# Patient Record
Sex: MALE | Race: NATIVE HAWAIIAN OR OTHER PACIFIC ISLANDER | NOT HISPANIC OR LATINO | Employment: FULL TIME | URBAN - METROPOLITAN AREA
[De-identification: names, ages, dates, MRNs, and addresses within clinical notes are randomized per-mention and may not be internally consistent; named-entity substitution may affect disease eponyms.]

---

## 2024-08-06 ENCOUNTER — OFFICE VISIT (OUTPATIENT)
Dept: OTOLARYNGOLOGY | Facility: CLINIC | Age: 44
End: 2024-08-06
Payer: COMMERCIAL

## 2024-08-06 VITALS — WEIGHT: 198 LBS | HEIGHT: 72 IN | BODY MASS INDEX: 26.82 KG/M2

## 2024-08-06 DIAGNOSIS — H61.22 IMPACTED CERUMEN OF LEFT EAR: Primary | ICD-10-CM

## 2024-08-06 PROCEDURE — 69210 REMOVE IMPACTED EAR WAX UNI: CPT | Mod: S$GLB,,, | Performed by: OTOLARYNGOLOGY

## 2024-08-06 PROCEDURE — 99999 PR PBB SHADOW E&M-NEW PATIENT-LVL III: CPT | Mod: PBBFAC,,, | Performed by: OTOLARYNGOLOGY

## 2024-08-06 PROCEDURE — 99499 UNLISTED E&M SERVICE: CPT | Mod: 25,S$GLB,, | Performed by: OTOLARYNGOLOGY

## 2024-11-18 ENCOUNTER — OFFICE VISIT (OUTPATIENT)
Dept: UROLOGY | Facility: CLINIC | Age: 44
End: 2024-11-18
Payer: COMMERCIAL

## 2024-11-18 ENCOUNTER — HOSPITAL ENCOUNTER (OUTPATIENT)
Dept: RADIOLOGY | Facility: HOSPITAL | Age: 44
Discharge: HOME | End: 2024-11-18
Attending: UROLOGY
Payer: COMMERCIAL

## 2024-11-18 ENCOUNTER — TELEPHONE (OUTPATIENT)
Dept: UROLOGY | Facility: CLINIC | Age: 44
End: 2024-11-18

## 2024-11-18 VITALS
BODY MASS INDEX: 26.18 KG/M2 | DIASTOLIC BLOOD PRESSURE: 87 MMHG | SYSTOLIC BLOOD PRESSURE: 147 MMHG | HEIGHT: 72 IN | WEIGHT: 193.31 LBS | HEART RATE: 97 BPM | RESPIRATION RATE: 18 BRPM

## 2024-11-18 DIAGNOSIS — N50.9 TESTICULAR LESION: Primary | ICD-10-CM

## 2024-11-18 DIAGNOSIS — N50.9 TESTICULAR LESION: ICD-10-CM

## 2024-11-18 PROCEDURE — 3077F SYST BP >= 140 MM HG: CPT | Mod: CPTII,S$GLB,, | Performed by: UROLOGY

## 2024-11-18 PROCEDURE — 99999 PR PBB SHADOW E&M-EST. PATIENT-LVL III: CPT | Mod: PBBFAC,,, | Performed by: UROLOGY

## 2024-11-18 PROCEDURE — 76870 US EXAM SCROTUM: CPT | Mod: TC,PN

## 2024-11-18 PROCEDURE — 3008F BODY MASS INDEX DOCD: CPT | Mod: CPTII,S$GLB,, | Performed by: UROLOGY

## 2024-11-18 PROCEDURE — 1160F RVW MEDS BY RX/DR IN RCRD: CPT | Mod: CPTII,S$GLB,, | Performed by: UROLOGY

## 2024-11-18 PROCEDURE — 1159F MED LIST DOCD IN RCRD: CPT | Mod: CPTII,S$GLB,, | Performed by: UROLOGY

## 2024-11-18 PROCEDURE — 3079F DIAST BP 80-89 MM HG: CPT | Mod: CPTII,S$GLB,, | Performed by: UROLOGY

## 2024-11-18 PROCEDURE — 99204 OFFICE O/P NEW MOD 45 MIN: CPT | Mod: S$GLB,,, | Performed by: UROLOGY

## 2024-11-18 PROCEDURE — 76870 US EXAM SCROTUM: CPT | Mod: 26,,, | Performed by: STUDENT IN AN ORGANIZED HEALTH CARE EDUCATION/TRAINING PROGRAM

## 2024-11-18 NOTE — TELEPHONE ENCOUNTER
Called patient and let him know that everything was clear and we will follow up as needed and if he needs anything to contact our office. Patient expressed understanding.      ----- Message from Sid Cleveland MD sent at 11/18/2024  1:45 PM CST -----  Let him know that it is clear and will follow on an as needed basis

## 2024-11-18 NOTE — PROGRESS NOTES
Chief Complaint:   Encounter Diagnosis   Name Primary?    Testicular lesion Yes       HPI:  43-year-old gentleman who noted this weekend to have a lesion on his right testicle.  States that he rechecked a yesterday in his seems to have gone, no previous history of this.  It appears to be at the lower pole per his report.  No lower urinary tract symptoms, no evidence of pain, no other urological history, no family history of urological cancers or stones.    Allergies:  Patient has no known allergies.    Medications:  See MAR    Review of Systems:  General: No fever, chills, fatigability, or weight loss.  Skin: No rashes, itching, or changes in color or texture of skin.  Chest: Denies NAJERA, cyanosis, wheezing, cough, and sputum production.  Abdomen: Appetite fine. No weight loss. Denies diarrhea, abdominal pain, hematemesis, or blood in stool.  Musculoskeletal: No joint stiffness or swelling. Denies back pain.  : As above.  All other review of systems negative.    PMH:   has no past medical history on file.    PSH:   has no past surgical history on file.    FamHx: family history is not on file.    SocHx:  reports that he has never smoked. He has never used smokeless tobacco. No history on file for alcohol use and drug use.      Physical Exam:  Vitals:    11/18/24 0811   BP: (!) 147/87   Pulse: 97   Resp: 18     General: A&Ox3, no apparent distress, no deformities  Neck: No masses, normal ROM  Lungs: normal inspiration, no use of accessory muscles  Heart: normal pulse, no arrhythmias  Abdomen: Soft, NT, ND, no masses, no hernias, no hepatosplenomegaly  Skin: The skin is warm and dry. No jaundice.  Ext: No c/c/e.  : 11/24- Test desc giuliana, no abnormalities of epididymus. Normal penile and scrotal skin. Meatus normal.    Labs/Studies:   Pending    Impression/Plan:     Right testicular lesion- on examination today no lesion is identified.  Will obtain a scrotal ultrasound and if clear may follow on an as needed basis.   For any abnormalities we will pursue as appropriate, call with any other issues.  Patient will continue routine examinations.

## 2024-12-03 ENCOUNTER — LAB VISIT (OUTPATIENT)
Dept: LAB | Facility: HOSPITAL | Age: 44
End: 2024-12-03
Attending: FAMILY MEDICINE
Payer: COMMERCIAL

## 2024-12-03 ENCOUNTER — PATIENT MESSAGE (OUTPATIENT)
Dept: PREADMISSION TESTING | Facility: HOSPITAL | Age: 44
End: 2024-12-03
Payer: COMMERCIAL

## 2024-12-03 ENCOUNTER — OFFICE VISIT (OUTPATIENT)
Dept: PRIMARY CARE CLINIC | Facility: CLINIC | Age: 44
End: 2024-12-03
Payer: COMMERCIAL

## 2024-12-03 VITALS
BODY MASS INDEX: 26.28 KG/M2 | SYSTOLIC BLOOD PRESSURE: 138 MMHG | DIASTOLIC BLOOD PRESSURE: 84 MMHG | WEIGHT: 194 LBS | HEIGHT: 72 IN | HEART RATE: 103 BPM

## 2024-12-03 DIAGNOSIS — Z13.1 ENCOUNTER FOR SCREENING FOR DIABETES MELLITUS: ICD-10-CM

## 2024-12-03 DIAGNOSIS — Z11.59 ENCOUNTER FOR HCV SCREENING TEST FOR LOW RISK PATIENT: ICD-10-CM

## 2024-12-03 DIAGNOSIS — Z12.11 COLON CANCER SCREENING: ICD-10-CM

## 2024-12-03 DIAGNOSIS — Z13.29 THYROID DISORDER SCREENING: ICD-10-CM

## 2024-12-03 DIAGNOSIS — Z13.6 ENCOUNTER FOR LIPID SCREENING FOR CARDIOVASCULAR DISEASE: ICD-10-CM

## 2024-12-03 DIAGNOSIS — Z11.4 ENCOUNTER FOR SCREENING FOR HIV: ICD-10-CM

## 2024-12-03 DIAGNOSIS — Z76.89 ENCOUNTER TO ESTABLISH CARE: ICD-10-CM

## 2024-12-03 DIAGNOSIS — Z00.00 ANNUAL PHYSICAL EXAM: Primary | ICD-10-CM

## 2024-12-03 DIAGNOSIS — Z00.00 ANNUAL PHYSICAL EXAM: ICD-10-CM

## 2024-12-03 DIAGNOSIS — Z13.220 ENCOUNTER FOR LIPID SCREENING FOR CARDIOVASCULAR DISEASE: ICD-10-CM

## 2024-12-03 DIAGNOSIS — Z12.5 ENCOUNTER FOR SCREENING FOR MALIGNANT NEOPLASM OF PROSTATE: ICD-10-CM

## 2024-12-03 LAB
ESTIMATED AVG GLUCOSE: 100 MG/DL (ref 68–131)
HBA1C MFR BLD: 5.1 % (ref 4–5.6)

## 2024-12-03 PROCEDURE — 87389 HIV-1 AG W/HIV-1&-2 AB AG IA: CPT | Performed by: FAMILY MEDICINE

## 2024-12-03 PROCEDURE — 80053 COMPREHEN METABOLIC PANEL: CPT | Performed by: FAMILY MEDICINE

## 2024-12-03 PROCEDURE — 80061 LIPID PANEL: CPT | Performed by: FAMILY MEDICINE

## 2024-12-03 PROCEDURE — 84153 ASSAY OF PSA TOTAL: CPT | Performed by: FAMILY MEDICINE

## 2024-12-03 PROCEDURE — 99999 PR PBB SHADOW E&M-EST. PATIENT-LVL IV: CPT | Mod: PBBFAC,,, | Performed by: FAMILY MEDICINE

## 2024-12-03 PROCEDURE — 83036 HEMOGLOBIN GLYCOSYLATED A1C: CPT | Performed by: FAMILY MEDICINE

## 2024-12-03 PROCEDURE — 3008F BODY MASS INDEX DOCD: CPT | Mod: CPTII,S$GLB,, | Performed by: FAMILY MEDICINE

## 2024-12-03 PROCEDURE — 86803 HEPATITIS C AB TEST: CPT | Performed by: FAMILY MEDICINE

## 2024-12-03 PROCEDURE — 36415 COLL VENOUS BLD VENIPUNCTURE: CPT | Mod: PN | Performed by: FAMILY MEDICINE

## 2024-12-03 PROCEDURE — 3079F DIAST BP 80-89 MM HG: CPT | Mod: CPTII,S$GLB,, | Performed by: FAMILY MEDICINE

## 2024-12-03 PROCEDURE — 3075F SYST BP GE 130 - 139MM HG: CPT | Mod: CPTII,S$GLB,, | Performed by: FAMILY MEDICINE

## 2024-12-03 PROCEDURE — 99386 PREV VISIT NEW AGE 40-64: CPT | Mod: S$GLB,,, | Performed by: FAMILY MEDICINE

## 2024-12-03 PROCEDURE — 84443 ASSAY THYROID STIM HORMONE: CPT | Performed by: FAMILY MEDICINE

## 2024-12-03 PROCEDURE — 85025 COMPLETE CBC W/AUTO DIFF WBC: CPT | Performed by: FAMILY MEDICINE

## 2024-12-03 NOTE — PROGRESS NOTES
"    [facility]       [address] [place] [address]      Phone: [phone]      Fax: [phone]    Sajan Castorena MD                Office Visit  12/03/2024        Subjective      HPI:  Torsten Ross is a 44 y.o. male presents today in clinic for "Establish Care  ."     44-year-old gentleman presents today to establish care, wellness exam.      Overall, feels okay.  States that he has some mild congestion, runny nose, postnasal drip.  Started a couple of days ago.  No sore throat.  No fever, chills.  No coughing, sneezing.    Otherwise, doing well.  No chest pain, shortness on breath.  No fever, chills, body aches.  No coughing, sneezing, URI type symptoms.  Appetite normal.  Bowel movements normal.  No urinary issues.      Just turned 44.  He would like to do colon cancer screening.  He is interested in colonoscopy.      PMH:  None reported   PSH: None reported   F MH: CAD   Allergies: NKDA   Social:  Works as an  doing IT for [employer].  Wife is a nurse (retired).  T: Denies   A: Denies   D: Denies     Exercise: Goes to the gym occasionally.  Plans to get more regular after the new year.  Walks dog every day.      Colon: None        The following were updated and reviewed by myself in the chart: medications, past medical history, past surgical history, family history, social history, and allergies.     Medications:  Current Outpatient Medications on File Prior to Visit   Medication Sig Dispense Refill    [DISCONTINUED] tobramycin sulfate 0.3% (TOBREX) 0.3 % ophthalmic solution Place 1 drop into the right eye 2 (two) times daily. Apply to nail bed 5 mL 0     No current facility-administered medications on file prior to visit.        PMHx:  History reviewed. No pertinent past medical history.   Patient Active Problem List    Diagnosis Date Noted    Testicular lesion 11/18/2024        PSHx:  History reviewed. No pertinent " surgical history.     FHx:  Family History   Problem Relation Name Age of Onset    No Known Problems Mother          Social:  Social History     Socioeconomic History    Marital status:    Tobacco Use    Smoking status: Never    Smokeless tobacco: Never   Substance and Sexual Activity    Alcohol use: Never    Drug use: Never    Sexual activity: Yes     Partners: Female     Birth control/protection: None        Allergies:  Review of patient's allergies indicates:  No Known Allergies     ROS:  Review of Systems   Constitutional:  Negative for activity change, appetite change, chills and fever.   HENT:  Positive for congestion, postnasal drip and rhinorrhea. Negative for sore throat and trouble swallowing.    Respiratory:  Negative for cough and shortness of breath.    Cardiovascular:  Negative for chest pain and palpitations.   Gastrointestinal:  Negative for abdominal pain, constipation, diarrhea, nausea and vomiting.   Genitourinary:  Negative for difficulty urinating.   Musculoskeletal:  Negative for arthralgias and myalgias.   Skin:  Negative for color change and rash.   Neurological:  Negative for headaches.   All other systems reviewed and are negative.         Objective      /84   Pulse 103   Ht 6' (1.829 m)   Wt 88 kg (194 lb 0.1 oz)   BMI 26.31 kg/m²   Ht Readings from Last 3 Encounters:   12/03/24 6' (1.829 m)   11/18/24 6' (1.829 m)   08/06/24 6' (1.829 m)     Wt Readings from Last 3 Encounters:   12/03/24 88 kg (194 lb 0.1 oz)   11/18/24 87.7 kg (193 lb 5.5 oz)   08/06/24 89.8 kg (197 lb 15.6 oz)       PHYSICAL EXAM:  Physical Exam  Vitals and nursing note reviewed.   Constitutional:       General: He is not in acute distress.     Appearance: Normal appearance.   HENT:      Head: Normocephalic and atraumatic.      Right Ear: Tympanic membrane, ear canal and external ear normal.      Left Ear: Tympanic membrane, ear canal and external ear normal.      Nose: Nose normal. No congestion or  rhinorrhea.      Mouth/Throat:      Mouth: Mucous membranes are moist.      Pharynx: Oropharynx is clear. No oropharyngeal exudate or posterior oropharyngeal erythema.   Eyes:      Extraocular Movements: Extraocular movements intact.      Conjunctiva/sclera: Conjunctivae normal.      Pupils: Pupils are equal, round, and reactive to light.   Cardiovascular:      Rate and Rhythm: Normal rate and regular rhythm.   Pulmonary:      Effort: Pulmonary effort is normal.      Breath sounds: No wheezing, rhonchi or rales.   Musculoskeletal:         General: Normal range of motion.      Cervical back: Normal range of motion.   Lymphadenopathy:      Cervical: No cervical adenopathy.   Skin:     General: Skin is warm and dry.   Neurological:      General: No focal deficit present.      Mental Status: He is alert.              LABS / IMAGING:  No results found for this or any previous visit (from the past 26 weeks).      Assessment    1. Annual physical exam    2. Encounter to establish care    3. Thyroid disorder screening    4. Encounter for lipid screening for cardiovascular disease    5. Encounter for screening for diabetes mellitus    6. Encounter for HCV screening test for low risk patient    7. Encounter for screening for HIV    8. Encounter for screening for malignant neoplasm of prostate    9. Colon cancer screening          Plan    Torsten was seen today for establish care.    Diagnoses and all orders for this visit:    Annual physical exam  -     CBC Auto Differential; Future  -     Comprehensive Metabolic Panel; Future  -     TSH; Future  -     Lipid Panel; Future  -     PSA, Screening; Future  -     Hemoglobin A1C; Future  -     HIV 1/2 Ag/Ab (4th Gen); Future  -     Hepatitis C Antibody; Future    Encounter to establish care  -     CBC Auto Differential; Future  -     Comprehensive Metabolic Panel; Future  -     TSH; Future  -     Lipid Panel; Future  -     PSA, Screening; Future  -     Hemoglobin A1C; Future  -      HIV 1/2 Ag/Ab (4th Gen); Future  -     Hepatitis C Antibody; Future    Thyroid disorder screening  -     TSH; Future    Encounter for lipid screening for cardiovascular disease  -     Lipid Panel; Future    Encounter for screening for diabetes mellitus  -     Hemoglobin A1C; Future    Encounter for HCV screening test for low risk patient  -     Hepatitis C Antibody; Future    Encounter for screening for HIV  -     HIV 1/2 Ag/Ab (4th Gen); Future    Encounter for screening for malignant neoplasm of prostate    Colon cancer screening  -     Ambulatory referral/consult to Endo Procedure ; Future  -     PSA, Screening; Future      Physically, everything looks good.      Recommended OTC antihistamine, saline spray, for runny nose, congestion.      Screening labs, as above.      Order for colonoscopy placed.      Blood pressure at the upper limits of normal in clinic.  He states that his pressure at home is usually SBP 120s.  Wife is a nurse, so she has him checking it on a somewhat regular basis.  If it starts running 140/90 or above, he will bring us a home log and we can consider blood pressure medication.  Otherwise, I think he is okay at this time.    FOLLOW-UP:  Follow up in about 1 year (around 12/3/2025) for annual exam.    I spent a total of 30 minutes face to face and non-face to face on the date of this visit.This includes time preparing to see the patient (eg, review of tests, notes), obtaining and/or reviewing additional history from an independent historian and/or outside medical records, documenting clinical information in the electronic health record, independently interpreting results and/or communicating results to the patient/family/caregiver, or care coordinator.  Visit today included increased complexity associated with the care of the episodic problem addressed and managing the longitudinal care of the patient due to the serious and/or complex managed problem(s).    Signed by:  Sajan ROB  MD Kell

## 2024-12-03 NOTE — PATIENT INSTRUCTIONS
Overall, everything looks really good today.      Screening blood work ordered today.  We will contact you with the results as soon as they are available.      We are due for colon cancer screening.  Order for screening colonoscopy placed today.  The Endoscopy  should contact you to pick a day and time that is convenient.  They will also discuss the prep.     Continue to eat a healthy diet.  Be careful with portion sizes.  Includes lots of fresh fruits, vegetables, whole grains, lean proteins.  See info below.    Keep hydrated.  Be sure to drink at least 8-10, 8 oz, glasses of water every day.    Stay active.  Try to do some sort of physical activity every day.  Nothing outrageous, just try walking for 10-15 minutes each day.     Keep an eye on your blood pressure at home.  If you see the top number consistently 135 or above, with the bottom number consistently 90 or above, keep a log of your readings.  See us with the list of home readings.  If your pressure is consistently above these numbers, we may ultimately want to start you on some blood pressure medication.  In the meantime, focus on diet, exercise, as above.      For the congestion, runny nose, try using an OTC antihistamine daily for the next two weeks.  Supplement with OTC nasal saline spray.

## 2024-12-04 LAB
ALBUMIN SERPL BCP-MCNC: 4.2 G/DL (ref 3.5–5.2)
ALP SERPL-CCNC: 51 U/L (ref 40–150)
ALT SERPL W/O P-5'-P-CCNC: 27 U/L (ref 10–44)
ANION GAP SERPL CALC-SCNC: 7 MMOL/L (ref 8–16)
AST SERPL-CCNC: 22 U/L (ref 10–40)
BASOPHILS # BLD AUTO: 0.05 K/UL (ref 0–0.2)
BASOPHILS NFR BLD: 0.5 % (ref 0–1.9)
BILIRUB SERPL-MCNC: 0.9 MG/DL (ref 0.1–1)
BUN SERPL-MCNC: 13 MG/DL (ref 6–20)
CALCIUM SERPL-MCNC: 9.8 MG/DL (ref 8.7–10.5)
CHLORIDE SERPL-SCNC: 104 MMOL/L (ref 95–110)
CHOLEST SERPL-MCNC: 232 MG/DL (ref 120–199)
CHOLEST/HDLC SERPL: 4.3 {RATIO} (ref 2–5)
CO2 SERPL-SCNC: 29 MMOL/L (ref 23–29)
COMPLEXED PSA SERPL-MCNC: 0.8 NG/ML (ref 0–4)
CREAT SERPL-MCNC: 1.2 MG/DL (ref 0.5–1.4)
DIFFERENTIAL METHOD BLD: ABNORMAL
EOSINOPHIL # BLD AUTO: 0.2 K/UL (ref 0–0.5)
EOSINOPHIL NFR BLD: 1.7 % (ref 0–8)
ERYTHROCYTE [DISTWIDTH] IN BLOOD BY AUTOMATED COUNT: 11.9 % (ref 11.5–14.5)
EST. GFR  (NO RACE VARIABLE): >60 ML/MIN/1.73 M^2
GLUCOSE SERPL-MCNC: 103 MG/DL (ref 70–110)
HCT VFR BLD AUTO: 47.4 % (ref 40–54)
HCV AB SERPL QL IA: NORMAL
HDLC SERPL-MCNC: 54 MG/DL (ref 40–75)
HDLC SERPL: 23.3 % (ref 20–50)
HGB BLD-MCNC: 15.6 G/DL (ref 14–18)
HIV 1+2 AB+HIV1 P24 AG SERPL QL IA: NORMAL
IMM GRANULOCYTES # BLD AUTO: 0.03 K/UL (ref 0–0.04)
IMM GRANULOCYTES NFR BLD AUTO: 0.3 % (ref 0–0.5)
LDLC SERPL CALC-MCNC: 156.4 MG/DL (ref 63–159)
LYMPHOCYTES # BLD AUTO: 1.2 K/UL (ref 1–4.8)
LYMPHOCYTES NFR BLD: 12.9 % (ref 18–48)
MCH RBC QN AUTO: 30.2 PG (ref 27–31)
MCHC RBC AUTO-ENTMCNC: 32.9 G/DL (ref 32–36)
MCV RBC AUTO: 92 FL (ref 82–98)
MONOCYTES # BLD AUTO: 0.9 K/UL (ref 0.3–1)
MONOCYTES NFR BLD: 9 % (ref 4–15)
NEUTROPHILS # BLD AUTO: 7.1 K/UL (ref 1.8–7.7)
NEUTROPHILS NFR BLD: 75.6 % (ref 38–73)
NONHDLC SERPL-MCNC: 178 MG/DL
NRBC BLD-RTO: 0 /100 WBC
PLATELET # BLD AUTO: 268 K/UL (ref 150–450)
PMV BLD AUTO: 11.7 FL (ref 9.2–12.9)
POTASSIUM SERPL-SCNC: 4.2 MMOL/L (ref 3.5–5.1)
PROT SERPL-MCNC: 7.6 G/DL (ref 6–8.4)
RBC # BLD AUTO: 5.16 M/UL (ref 4.6–6.2)
SODIUM SERPL-SCNC: 140 MMOL/L (ref 136–145)
TRIGL SERPL-MCNC: 108 MG/DL (ref 30–150)
TSH SERPL DL<=0.005 MIU/L-ACNC: 0.8 UIU/ML (ref 0.4–4)
WBC # BLD AUTO: 9.4 K/UL (ref 3.9–12.7)

## 2024-12-04 NOTE — PROGRESS NOTES
Overall, your blood work looks good.      Blood counts all look fine.  Electrolytes, kidney function, liver function, and thyroid function were all normal.  Prostate levels are normal, no signs of prostate issues.  You were negative for HIV, hepatitis-C.  Random glucose and A1c are in the normal range, so no signs of diabetes.    Total cholesterol was a little elevated at 232.  Ideally, we would like this under 200.  LDL, or bad cholesterol, was also a little high at 156.  We would like this closer to 100.  Otherwise, triglycerides and HDL were in the normal ranges.    My first-line treatment for this is always diet and exercise.  Continue to eat a healthy diet.  Be careful with portion sizes.  Includes lots of fresh fruits, vegetables, whole grains, lean proteins.    Keep hydrated.  Be sure to drink at least 8-10, 8 oz, glasses of water every day.    Stay active.  Try to do some sort of physical activity every day.  Nothing outrageous, just try walking for 10-15 minutes each day.

## 2024-12-11 ENCOUNTER — HOSPITAL ENCOUNTER (OUTPATIENT)
Dept: PREADMISSION TESTING | Facility: HOSPITAL | Age: 44
Discharge: HOME | End: 2024-12-11
Attending: FAMILY MEDICINE
Payer: COMMERCIAL

## 2024-12-11 DIAGNOSIS — Z12.11 COLON CANCER SCREENING: Primary | ICD-10-CM

## 2024-12-11 RX ORDER — POLYETHYLENE GLYCOL 3350, SODIUM SULFATE ANHYDROUS, SODIUM BICARBONATE, SODIUM CHLORIDE, POTASSIUM CHLORIDE 236; 22.74; 6.74; 5.86; 2.97 G/4L; G/4L; G/4L; G/4L; G/4L
4 POWDER, FOR SOLUTION ORAL ONCE
Qty: 4000 ML | Refills: 0 | Status: SHIPPED | OUTPATIENT
Start: 2024-12-11 | End: 2024-12-11

## 2024-12-16 ENCOUNTER — PATIENT MESSAGE (OUTPATIENT)
Dept: ADMINISTRATIVE | Facility: HOSPITAL | Age: 44
End: 2024-12-16
Payer: COMMERCIAL

## 2024-12-19 ENCOUNTER — OFFICE VISIT (OUTPATIENT)
Dept: PRIMARY CARE CLINIC | Facility: CLINIC | Age: 44
End: 2024-12-19
Payer: COMMERCIAL

## 2024-12-19 VITALS
RESPIRATION RATE: 16 BRPM | DIASTOLIC BLOOD PRESSURE: 72 MMHG | BODY MASS INDEX: 25.95 KG/M2 | SYSTOLIC BLOOD PRESSURE: 118 MMHG | HEIGHT: 72 IN | OXYGEN SATURATION: 97 % | HEART RATE: 80 BPM | WEIGHT: 191.56 LBS | TEMPERATURE: 98 F

## 2024-12-19 DIAGNOSIS — B00.1 HERPESVIRAL VESICULAR DERMATITIS: Primary | ICD-10-CM

## 2024-12-19 PROCEDURE — 99999 PR PBB SHADOW E&M-EST. PATIENT-LVL III: CPT | Mod: PBBFAC,,, | Performed by: PHYSICIAN ASSISTANT

## 2024-12-19 RX ORDER — VALACYCLOVIR HYDROCHLORIDE 1 G/1
1000 TABLET, FILM COATED ORAL 3 TIMES DAILY
Qty: 21 TABLET | Refills: 0 | Status: SHIPPED | OUTPATIENT
Start: 2024-12-19 | End: 2024-12-26

## 2024-12-19 NOTE — PROGRESS NOTES
"    [facility]       [address] [place] [address]      Phone: [phone]      Fax: [phone]    Sienna Singh PA-C                Office Visit  12/19/2024        Subjective      HPI:  Torsten Ross is a 44 y.o. male presents today in clinic for "Rash (Lower back, couple of months, left side only off and on)  ."     CHIEF COMPLAINT:  Patient presents with a recurrent rash on the left side of his back that has been occurring for 3-6 months.    HPI:  Patient reports a recurrent rash on the left side of his back, specifically above the left buttocks, for the past 3-6 months. The rash appears intermittently, forming small blisters that eventually scab over and heal. Each episode typically lasts 7-10 days from onset to resolution, with asymptomatic periods of 1-2 months between recurrences.    A tingling sensation occurs when the rash begins to form, usually taking 4-5 days to complete its lifecycle. Patient reports no pain associated with the rash, but notes mild discomfort when sitting for extended periods while working from home. He has been using OTC 1% hydrocortisone cream with a non-stick pad for treatment.    In a recent episode, the patient inadvertently ruptured one of the blisters, prolonging the healing time. He has attempted to eliminate potential triggers such as switching to hypoallergenic detergent and changing sheets, but the rash continues to recur.    A new lesion formed 2-3 days ago on the upper part of left buttock, in addition to the main area that is currently scabbed over and about a week old. Patient denies any systemic symptoms such as fever or body aches associated with the rash outbreaks. He reports having had an upper respiratory infection about 1.5 weeks ago but does not believe it is related to the rash as he was having the rash before the illness.    Patient denies pain, throbbing sensation, or drainage from the rash, and any " recent stressful events or changes in his life.    Rash  This is a recurrent problem. The current episode started in the past 7 days. The problem has been gradually improving since onset. The affected locations include the back. The rash is characterized by itchiness. He was exposed to nothing. Pertinent negatives include no anorexia, congestion, cough, diarrhea, eye pain, facial edema, fatigue, fever, joint pain, nail changes, rhinorrhea, shortness of breath, sore throat or vomiting. Past treatments include anti-itch cream. The treatment provided mild relief. His past medical history is significant for varicella. There is no history of allergies, asthma or eczema.             The following were updated and reviewed by myself in the chart: medications, past medical history, past surgical history, family history, social history, and allergies.     Medications:  No current outpatient medications on file prior to visit.     No current facility-administered medications on file prior to visit.        PMHx:  History reviewed. No pertinent past medical history.   Patient Active Problem List    Diagnosis Date Noted    Testicular lesion 11/18/2024        PSHx:  History reviewed. No pertinent surgical history.     FHx:  Family History   Problem Relation Name Age of Onset    No Known Problems Mother          Social:  Social History     Socioeconomic History    Marital status:    Tobacco Use    Smoking status: Never     Passive exposure: Never    Smokeless tobacco: Never   Substance and Sexual Activity    Alcohol use: Never    Drug use: Never    Sexual activity: Yes     Partners: Female     Birth control/protection: None        Allergies:  Review of patient's allergies indicates:  No Known Allergies     ROS:  Review of Systems   Constitutional:  Negative for fatigue and fever.   HENT:  Negative for congestion, rhinorrhea and sore throat.    Eyes:  Negative for pain.   Respiratory:  Negative for cough and shortness of  breath.    Gastrointestinal:  Negative for anorexia, diarrhea and vomiting.   Musculoskeletal:  Negative for arthralgias, back pain and joint pain.   Skin:  Positive for rash. Negative for nail changes.   Neurological:  Negative for weakness and numbness.          Objective      /72 (BP Location: Right arm, Patient Position: Sitting)   Pulse 80   Temp 97.5 °F (36.4 °C) (Oral)   Resp 16   Ht 6' (1.829 m)   Wt 86.9 kg (191 lb 9.3 oz)   SpO2 97%   BMI 25.98 kg/m²   Ht Readings from Last 3 Encounters:   12/19/24 6' (1.829 m)   12/03/24 6' (1.829 m)   11/18/24 6' (1.829 m)     Wt Readings from Last 3 Encounters:   12/19/24 86.9 kg (191 lb 9.3 oz)   12/03/24 88 kg (194 lb 0.1 oz)   11/18/24 87.7 kg (193 lb 5.5 oz)       PHYSICAL EXAM:  Physical Exam  Constitutional:       General: He is not in acute distress.     Appearance: Normal appearance. He is not ill-appearing.   Cardiovascular:      Rate and Rhythm: Normal rate and regular rhythm.      Pulses: Normal pulses.      Heart sounds: Normal heart sounds.   Pulmonary:      Effort: Pulmonary effort is normal. No respiratory distress.      Breath sounds: Normal breath sounds.   Abdominal:      General: Abdomen is flat. Bowel sounds are normal.      Tenderness: There is no abdominal tenderness.   Musculoskeletal:         General: Normal range of motion.      Cervical back: Normal range of motion.      Right lower leg: No edema.      Left lower leg: No edema.   Skin:     General: Skin is warm and dry.      Findings: Rash present. Rash is vesicular (left upper gluteal region, cluster of vesicles. to the left there is a healed, scabbed over area. about 3-4 inches above this cluster, there is another single vesicle.).   Neurological:      General: No focal deficit present.      Mental Status: He is alert and oriented to person, place, and time.            Assessment    1. Herpesviral vesicular dermatitis      IMPRESSION:  - Assessed recurrent rash on patient's left  lower back as likely herpetic infection, possibly herpes simplex virus (HSV) or herpes zoster (shingles)  - Considered differential between HSV and shingles based on location, recurrent nature, presence of prodromal symptoms  - Will treat empirically with antiviral medication    Plan    Torsten was seen today for rash.    Diagnoses and all orders for this visit:    Herpesviral vesicular dermatitis    Other orders  -     valACYclovir (VALTREX) 1000 MG tablet; Take 1 tablet (1,000 mg total) by mouth 3 (three) times daily. for 7 days    HERPESVIRAL DISEASE:  - Explained herpes virus family, including HSV-1, HSV-2, and herpes zoster.  - Discussed potential modes of transmission for herpes viruses.  - Educated on virus remaining dormant in body and potential for recurrent outbreaks.  - Explained increased risk of outbreaks during stress, illness, or body changes.  - Patient to keep lesions covered if draining to prevent spread.  - Patient to monitor for any new lesions or changes in symptoms.  - Started antiviral medication.  - Instructed to begin treatment within 48 hours of symptom onset for future outbreaks.    FOLLOW-UP:  - Contact office if symptoms worsen or continue to recur frequently.  - Follow up via MyChart or e-visit if symptoms return, to potentially start another round of antivirals.       I spent a total of 20 minutes face to face and non-face to face on the date of this visit.This includes time preparing to see the patient (eg, review of tests, notes), obtaining and/or reviewing additional history from an independent historian and/or outside medical records, documenting clinical information in the electronic health record, independently interpreting results and/or communicating results to the patient/family/caregiver, or care coordinator.  Visit today included increased complexity associated with the care of the episodic problem addressed and managing the longitudinal care of the patient due to the  serious and/or complex managed problem(s).      Signed by:        Sienna Singh PA-C      This note was generated with the assistance of ambient listening technology. Verbal consent was obtained by the patient and accompanying visitor(s) for the recording of patient appointment to facilitate this note. I attest to having reviewed and edited the generated note for accuracy, though some syntax or spelling errors may persist. Please contact the author of this note for any clarification.

## 2024-12-30 ENCOUNTER — ANESTHESIA EVENT (OUTPATIENT)
Dept: ENDOSCOPY | Facility: HOSPITAL | Age: 44
End: 2024-12-30
Payer: COMMERCIAL

## 2024-12-30 NOTE — ANESTHESIA PREPROCEDURE EVALUATION
12/30/2024  Torsten Ross is a 44 y.o., male.      Pre-op Assessment    I have reviewed the Patient Summary Reports.     I have reviewed the Nursing Notes. I have reviewed the NPO Status.   I have reviewed the Medications.     Review of Systems  Anesthesia Hx:  No problems with previous Anesthesia             Denies Family Hx of Anesthesia complications.    Denies Personal Hx of Anesthesia complications.                    Social:  Non-Smoker, No Alcohol Use       Hepatic/GI:  Bowel Prep.                       Physical Exam  General: Well nourished, Cooperative, Alert and Oriented    Airway:  Mallampati: II / I  Mouth Opening: Normal  TM Distance: Normal  Tongue: Normal  Neck ROM: Normal ROM        Anesthesia Plan  Type of Anesthesia, risks & benefits discussed:    Anesthesia Type: Gen Natural Airway  Intra-op Monitoring Plan: Standard ASA Monitors  Post Op Pain Control Plan: multimodal analgesia  Induction:  IV  Informed Consent: Informed consent signed with the Patient and all parties understand the risks and agree with anesthesia plan.  All questions answered. Patient consented to blood products? No  ASA Score: 1  Day of Surgery Review of History & Physical: H&P Update referred to the surgeon/provider.    Ready For Surgery From Anesthesia Perspective.     .

## 2025-01-02 ENCOUNTER — PATIENT MESSAGE (OUTPATIENT)
Dept: PRIMARY CARE CLINIC | Facility: CLINIC | Age: 45
End: 2025-01-02
Payer: COMMERCIAL

## 2025-01-03 ENCOUNTER — HOSPITAL ENCOUNTER (OUTPATIENT)
Facility: HOSPITAL | Age: 45
Discharge: HOME | End: 2025-01-03
Attending: INTERNAL MEDICINE | Admitting: INTERNAL MEDICINE
Payer: COMMERCIAL

## 2025-01-03 ENCOUNTER — ANESTHESIA (OUTPATIENT)
Dept: ENDOSCOPY | Facility: HOSPITAL | Age: 45
End: 2025-01-03
Payer: COMMERCIAL

## 2025-01-03 ENCOUNTER — OFFICE VISIT (OUTPATIENT)
Dept: PRIMARY CARE CLINIC | Facility: CLINIC | Age: 45
End: 2025-01-03
Payer: COMMERCIAL

## 2025-01-03 VITALS
DIASTOLIC BLOOD PRESSURE: 70 MMHG | SYSTOLIC BLOOD PRESSURE: 126 MMHG | WEIGHT: 189.63 LBS | BODY MASS INDEX: 25.68 KG/M2 | OXYGEN SATURATION: 100 % | TEMPERATURE: 98 F | HEART RATE: 94 BPM | RESPIRATION RATE: 18 BRPM | HEIGHT: 72 IN

## 2025-01-03 DIAGNOSIS — Z12.11 COLON CANCER SCREENING: Primary | ICD-10-CM

## 2025-01-03 DIAGNOSIS — B00.1 HERPESVIRAL VESICULAR DERMATITIS: Primary | ICD-10-CM

## 2025-01-03 PROCEDURE — 63600175 PHARM REV CODE 636 W HCPCS: Performed by: NURSE ANESTHETIST, CERTIFIED REGISTERED

## 2025-01-03 PROCEDURE — 25000003 PHARM REV CODE 250: Performed by: INTERNAL MEDICINE

## 2025-01-03 PROCEDURE — 37000008 HC ANESTHESIA 1ST 15 MINUTES: Performed by: INTERNAL MEDICINE

## 2025-01-03 PROCEDURE — G0121 COLON CA SCRN NOT HI RSK IND: HCPCS | Mod: ,,, | Performed by: INTERNAL MEDICINE

## 2025-01-03 PROCEDURE — 37000009 HC ANESTHESIA EA ADD 15 MINS: Performed by: INTERNAL MEDICINE

## 2025-01-03 PROCEDURE — G0121 COLON CA SCRN NOT HI RSK IND: HCPCS | Performed by: INTERNAL MEDICINE

## 2025-01-03 RX ORDER — VALACYCLOVIR HYDROCHLORIDE 1 G/1
1000 TABLET, FILM COATED ORAL EVERY 12 HOURS
Qty: 14 TABLET | Refills: 0 | Status: SHIPPED | OUTPATIENT
Start: 2025-01-03 | End: 2025-01-10

## 2025-01-03 RX ORDER — LIDOCAINE HYDROCHLORIDE 20 MG/ML
INJECTION INTRAVENOUS
Status: DISCONTINUED | OUTPATIENT
Start: 2025-01-03 | End: 2025-01-03

## 2025-01-03 RX ORDER — DEXTROMETHORPHAN/PSEUDOEPHED 2.5-7.5/.8
DROPS ORAL
Status: COMPLETED | OUTPATIENT
Start: 2025-01-03 | End: 2025-01-03

## 2025-01-03 RX ORDER — PROPOFOL 10 MG/ML
VIAL (ML) INTRAVENOUS
Status: DISCONTINUED | OUTPATIENT
Start: 2025-01-03 | End: 2025-01-03

## 2025-01-03 RX ADMIN — PROPOFOL 50 MG: 10 INJECTION, EMULSION INTRAVENOUS at 07:01

## 2025-01-03 RX ADMIN — LIDOCAINE HYDROCHLORIDE 50 MG: 20 INJECTION INTRAVENOUS at 07:01

## 2025-01-03 RX ADMIN — PROPOFOL 100 MG: 10 INJECTION, EMULSION INTRAVENOUS at 07:01

## 2025-01-03 NOTE — PROVATION PATIENT INSTRUCTIONS
Discharge Summary/Instructions after an Endoscopic Procedure  Patient Name: Torsten Ross  Patient MRN: [id number]  Patient YOB: 1980  Friday, January 3, 2025  John Moeller MD  Dear patient,  As a result of recent federal legislation (The Federal Cures Act), you may   receive lab or pathology results from your procedure in your [facility]   account before your physician is able to contact you. Your physician or   their representative will relay the results to you with their   recommendations at their soonest availability.  Thank you,  RESTRICTIONS:  During your procedure today, you received medications for sedation.  These   medications may affect your judgment, balance and coordination.  Therefore,   for 24 hours, you have the following restrictions:   - DO NOT drive a car, operate machinery, make legal/financial decisions,   sign important papers or drink alcohol.    ACTIVITY:  Today: no heavy lifting, straining or running due to procedural   sedation/anesthesia.  The following day: return to full activity including work.  DIET:  Eat and drink normally unless instructed otherwise.     TREATMENT FOR COMMON SIDE EFFECTS:  - Mild abdominal pain, nausea, belching, bloating or excessive gas:  rest,   eat lightly and use a heating pad.  - Sore Throat: treat with throat lozenges and/or gargle with warm salt   water.  - Because air was used during the procedure, expelling large amounts of air   from your rectum or belching is normal.  - If a bowel prep was taken, you may not have a bowel movement for 1-3 days.    This is normal.  SYMPTOMS TO WATCH FOR AND REPORT TO YOUR PHYSICIAN:  1. Abdominal pain or bloating, other than gas cramps.  2. Chest pain.  3. Back pain.  4. Signs of infection such as: chills or fever occurring within 24 hours   after the procedure.  5. Rectal bleeding, which would show as bright red, maroon, or black stools.   (A tablespoon of blood from the rectum is not serious, especially  if   hemorrhoids are present.)  6. Vomiting.  7. Weakness or dizziness.  GO DIRECTLY TO THE NEAREST EMERGENCY ROOM IF YOU HAVE ANY OF THE FOLLOWING:      Difficulty breathing              Chills and/or fever over 101 F   Persistent vomiting and/or vomiting blood   Severe abdominal pain   Severe chest pain   Black, tarry stools   Bleeding- more than one tablespoon   Any other symptom or condition that you feel may need urgent attention  Your doctor recommends these additional instructions:  If any biopsies were taken, your doctors clinic will contact you in 1 to 2   weeks with any results.  - Discharge patient to home.   - Resume previous diet.   - Continue present medications.   - Repeat colonoscopy in 1 year because the bowel preparation was suboptimal.     - Return to referring physician.   - Patient has a contact number available for emergencies.  The signs and   symptoms of potential delayed complications were discussed with the   patient.  Return to normal activities tomorrow.  Written discharge   instructions were provided to the patient.  For questions, problems or results please call your physician John Moeller MD at Work:  [phone]  If you have any questions about the above instructions, call the GI   department at [phone] or call the endoscopy unit at [phone]   from 7am until 3 pm.  [facility], EMERGENCY ROOM PHONE NUMBER:   [phone]  IF A COMPLICATION OR EMERGENCY SITUATION ARISES AND YOU ARE UNABLE TO REACH   YOUR PHYSICIAN - GO DIRECTLY TO THE EMERGENCY ROOM.  I have read or have had read to me these discharge instructions for my   procedure and have received a written copy.  I understand these   instructions and will follow-up with my physician if I have any questions.     __________________________________       _____________________________________  Nurse Signature                                          Patient/Designated   Responsible  Party Signature  John Moeller MD  1/3/2025 7:41:51 AM  This report has been verified and signed electronically.  Dear patient,  As a result of recent federal legislation (The Federal Cures Act), you may   receive lab or pathology results from your procedure in your [facility]   account before your physician is able to contact you. Your physician or   their representative will relay the results to you with their   recommendations at their soonest availability.  Thank you,  PROVATION

## 2025-01-03 NOTE — ANESTHESIA POSTPROCEDURE EVALUATION
Anesthesia Post Evaluation    Patient: Torsten Ross    Procedure(s) Performed: Procedure(s) (LRB):  COLONOSCOPY, SCREENING, LOW RISK PATIENT (N/A)    Final Anesthesia Type: general      Patient location during evaluation: PACU  Patient participation: Yes- Able to Participate  Level of consciousness: awake and alert  Post-procedure vital signs: reviewed and stable  Pain management: adequate  Airway patency: patent    PONV status at discharge: No PONV  Anesthetic complications: no      Cardiovascular status: stable  Respiratory status: spontaneous ventilation  Hydration status: euvolemic  Follow-up not needed.              Vitals Value Taken Time   /70 01/03/25 0811   Temp 36.7 °C (98 °F) 01/03/25 0741   Pulse 94 01/03/25 0811   Resp 18 01/03/25 0811   SpO2 100 % 01/03/25 0811         Event Time   Out of Recovery 08:11:00         Pain/Ale Score: Ale Score: 10 (1/3/2025  8:11 AM)

## 2025-01-03 NOTE — TRANSFER OF CARE
Anesthesia Transfer of Care Note    Patient: Torsten Ross    Procedure(s) Performed: Procedure(s) (LRB):  COLONOSCOPY, SCREENING, LOW RISK PATIENT (N/A)    Patient location: PACU    Anesthesia Type: general    Transport from OR: Transported from OR on room air with adequate spontaneous ventilation    Post pain: adequate analgesia    Post assessment: no apparent anesthetic complications    Post vital signs: stable    Level of consciousness: sedated    Nausea/Vomiting: no nausea/vomiting    Complications: none    Transfer of care protocol was followed      Last vitals: Visit Vitals  BP (!) 141/77   Pulse 107   Temp 36.3 °C (97.3 °F)   Resp 19   Ht 6' (1.829 m)   Wt 86 kg (189 lb 9.5 oz)   SpO2 100%   BMI 25.71 kg/m²

## 2025-01-03 NOTE — PROGRESS NOTES
"    [facility]       [address] [place] [address]      Phone: [phone]      Fax: [phone]    Sienna Singh PA-C                Video Visit  01/09/2025        Subjective      HPI:  Torsten Ross is a 44 y.o. male presents today via video for "No chief complaint on file.  ."     CHIEF COMPLAINT:  Patient presents with a recurrence of a rash.    HPI:  Patient reports a recurrence of a rash. He had previously completed a 7-day antiviral treatment prescribed by the practitioner, which had been effective in clearing up previous occurrences quickly. On New Year's Day, the patient noticed a new formation on the opposite side of the gluteal region, now on right side. The current presentation consists of one small cluster of lesions. Patient describes mild paresthesia but denies any pain. This rash has similar attributes to the previous occurrences. Patient has had multiple iterations of this rash, with the most recent treatment course ending just prior to this new outbreak.        The following were updated and reviewed by myself in the chart: medications, past medical history, past surgical history, family history, social history, and allergies.     Medications:  No current outpatient medications on file prior to visit.     No current facility-administered medications on file prior to visit.        PMHx:  No past medical history on file.   Patient Active Problem List    Diagnosis Date Noted    Testicular lesion 11/18/2024        PSHx:  Past Surgical History:   Procedure Laterality Date    COLONOSCOPY, SCREENING, LOW RISK PATIENT N/A 1/3/2025    Procedure: COLONOSCOPY, SCREENING, LOW RISK PATIENT;  Surgeon: John Moeller MD;  Location: [facility];  Service: Gastroenterology;  Laterality: N/A;        FHx:  Family History   Problem Relation Name Age of Onset    No Known Problems Mother          Social:  Social History     Socioeconomic History    " Marital status:    Tobacco Use    Smoking status: Never     Passive exposure: Never    Smokeless tobacco: Never   Substance and Sexual Activity    Alcohol use: Never    Drug use: Never    Sexual activity: Yes     Partners: Female     Birth control/protection: None        Allergies:  Review of patient's allergies indicates:  No Known Allergies     ROS:  Review of Systems   Constitutional:  Negative for activity change, appetite change and unexpected weight change.   Cardiovascular:  Negative for chest pain.   Musculoskeletal:  Negative for arthralgias and myalgias.   Neurological:  Negative for dizziness, weakness, light-headedness and headaches.          Objective      There were no vitals taken for this visit.  Ht Readings from Last 3 Encounters:   01/03/25 6' (1.829 m)   12/19/24 6' (1.829 m)   12/03/24 6' (1.829 m)     Wt Readings from Last 3 Encounters:   01/03/25 86 kg (189 lb 9.5 oz)   12/19/24 86.9 kg (191 lb 9.3 oz)   12/03/24 88 kg (194 lb 0.1 oz)       PHYSICAL EXAM:  Physical Exam  Constitutional:       Appearance: Normal appearance. He is not ill-appearing.   Pulmonary:      Effort: Pulmonary effort is normal.   Neurological:      General: No focal deficit present.      Mental Status: He is alert and oriented to person, place, and time.          Assessment    1. Herpesviral vesicular dermatitis      IMPRESSION:  - Prescribed antiviral medication for recurrent rash, likely in herpes virus family  - May consult dermatology for recommendations if needed  - Discussed possibility of confirmatory testing to identify specific virus if recurrences persist    Plan    Diagnoses and all orders for this visit:    Herpesviral vesicular dermatitis    Other orders  -     valACYclovir (VALTREX) 1000 MG tablet; Take 1 tablet (1,000 mg total) by mouth every 12 (twelve) hours. for 7 days    RECURRENT RASH (SUSPECTED HERPES ZOSTER):  - Noted the patient's report of rash recurrence on the opposite side with a new  formation of one small cluster, accompanied by a tingling sensation but no pain.  - Restarted a course of antiviral medication for rash treatment.  - Considered potential need for suppressive therapy if recurrences continue.  - Advised the patient to contact the office if rash reoccurs after completion of prescribed antiviral course.  - Instructed the patient to follow up if rash flares up again, which may require an in-person visit for sample collection and confirmatory testing.      The patient location is: home  The chief complaint leading to consultation is: rash, reoccurring     Visit type: audiovisual    Face to Face time with patient: 5 minutes    10 minutes of total time spent on the encounter, which includes face to face time and non-face to face time preparing to see the patient (eg, review of tests), Obtaining and/or reviewing separately obtained history, Documenting clinical information in the electronic or other health record, Independently interpreting results (not separately reported) and communicating results to the patient/family/caregiver, or Care coordination (not separately reported). Visit today included increased complexity associated with the care of the episodic problem addressed and managing the longitudinal care of the patient due to the serious and/or complex managed problem(s).    Each patient to whom he or she provides medical services by telemedicine is:  (1) informed of the relationship between the physician and patient and the respective role of any other health care provider with respect to management of the patient; and (2) notified that he or she may decline to receive medical services by telemedicine and may withdraw from such care at any time.    Signed by:  Sienna Singh PA-C      This note was generated with the assistance of ambient listening technology. Verbal consent was obtained by the patient and accompanying visitor(s) for the recording of patient appointment to  facilitate this note. I attest to having reviewed and edited the generated note for accuracy, though some syntax or spelling errors may persist. Please contact the author of this note for any clarification.

## 2025-01-03 NOTE — H&P
PRE PROCEDURE H&P    Patient Name: Torsten Rsos  MRN: [id number]  : 1980  Date of Procedure:  1/3/2025  Referring Physician: Sajan Castorena MD  Primary Physician: Sajan Castorena MD  Procedure Physician: John Moeller MD       Planned Procedure: Colonoscopy  Diagnosis: screening for colon cancer  Chief Complaint: Same as above    HPI: Patient is an 44 y.o. male is here for the above.     Last colonoscopy: Index colonoscopy   Family history: None  Anticoagulation: None    Past Medical History:   History reviewed. No pertinent past medical history.     Past Surgical History:  History reviewed. No pertinent surgical history.     Home Medications:  Prior to Admission medications    Medication Sig Start Date End Date Taking? Authorizing Provider   valACYclovir (VALTREX) 1000 MG tablet Take 1 tablet (1,000 mg total) by mouth 3 (three) times daily. for 7 days 24  Sienna Singh PA-C        Allergies:  Review of patient's allergies indicates:  No Known Allergies     Social History:   Social History     Socioeconomic History    Marital status:    Tobacco Use    Smoking status: Never     Passive exposure: Never    Smokeless tobacco: Never   Substance and Sexual Activity    Alcohol use: Never    Drug use: Never    Sexual activity: Yes     Partners: Female     Birth control/protection: None       Family History:  Family History   Problem Relation Name Age of Onset    No Known Problems Mother         ROS: No acute cardiac events, no acute respiratory complaints.     Physical Exam (all patients):    BP (!) 141/77   Pulse 107   Temp 97.3 °F (36.3 °C)   Resp 19   Ht 6' (1.829 m)   Wt 86 kg (189 lb 9.5 oz)   SpO2 100%   BMI 25.71 kg/m²   Lungs: Clear to auscultation bilaterally, respirations unlabored  Heart: Regular rate and rhythm, S1 and S2 normal, no obvious murmurs  Abdomen:         Soft, non-tender, bowel sounds normal, no masses, no organomegaly    Lab Results    Component Value Date    WBC 9.40 12/03/2024    MCV 92 12/03/2024    RDW 11.9 12/03/2024     12/03/2024     12/03/2024    HGBA1C 5.1 12/03/2024    BUN 13 12/03/2024     12/03/2024    K 4.2 12/03/2024     12/03/2024        SEDATION PLAN: per anesthesia      History reviewed, vital signs satisfactory, cardiopulmonary status satisfactory, sedation options, risks and plans have been discussed with the patient  All their questions were answered and the patient agrees to the sedation procedures as planned and the patient is deemed an appropriate candidate for the sedation as planned.    Procedure explained to patient, informed consent obtained and placed in chart.    John Moeller  1/3/2025  7:20 AM